# Patient Record
Sex: MALE | Race: WHITE | Employment: FULL TIME | URBAN - METROPOLITAN AREA
[De-identification: names, ages, dates, MRNs, and addresses within clinical notes are randomized per-mention and may not be internally consistent; named-entity substitution may affect disease eponyms.]

---

## 2021-12-27 ENCOUNTER — HOSPITAL ENCOUNTER (OUTPATIENT)
Age: 31
Setting detail: SPECIMEN
Discharge: HOME | End: 2021-12-27

## 2021-12-28 LAB
ABSOLUTE EOS #: 0.16 K/UL (ref 0–0.44)
ABSOLUTE IMMATURE GRANULOCYTE: 0.03 K/UL (ref 0–0.3)
ABSOLUTE LYMPH #: 1.72 K/UL (ref 1.1–3.7)
ABSOLUTE MONO #: 0.65 K/UL (ref 0.1–1.2)
ALBUMIN SERPL-MCNC: 3.9 G/DL (ref 3.5–5.2)
ALBUMIN/GLOBULIN RATIO: 1.5 (ref 1–2.5)
ALP BLD-CCNC: 64 U/L (ref 40–129)
ALT SERPL-CCNC: 20 U/L (ref 5–41)
ANION GAP SERPL CALCULATED.3IONS-SCNC: 15 MMOL/L (ref 9–17)
AST SERPL-CCNC: 23 U/L
BASOPHILS # BLD: 0 % (ref 0–2)
BASOPHILS ABSOLUTE: 0.03 K/UL (ref 0–0.2)
BILIRUB SERPL-MCNC: 0.19 MG/DL (ref 0.3–1.2)
BUN BLDV-MCNC: 17 MG/DL (ref 6–20)
BUN/CREAT BLD: ABNORMAL (ref 9–20)
CALCIUM SERPL-MCNC: 8.9 MG/DL (ref 8.6–10.4)
CHLORIDE BLD-SCNC: 103 MMOL/L (ref 98–107)
CHOLESTEROL/HDL RATIO: 3.3
CHOLESTEROL: 101 MG/DL
CO2: 20 MMOL/L (ref 20–31)
CREAT SERPL-MCNC: 1.07 MG/DL (ref 0.7–1.2)
DIFFERENTIAL TYPE: ABNORMAL
EOSINOPHILS RELATIVE PERCENT: 1 % (ref 1–4)
GFR AFRICAN AMERICAN: >60 ML/MIN
GFR NON-AFRICAN AMERICAN: >60 ML/MIN
GFR SERPL CREATININE-BSD FRML MDRD: ABNORMAL ML/MIN/{1.73_M2}
GFR SERPL CREATININE-BSD FRML MDRD: ABNORMAL ML/MIN/{1.73_M2}
GLUCOSE BLD-MCNC: 85 MG/DL (ref 70–99)
HCT VFR BLD CALC: 41.9 % (ref 40.7–50.3)
HDLC SERPL-MCNC: 31 MG/DL
HEMOGLOBIN: 13.7 G/DL (ref 13–17)
IMMATURE GRANULOCYTES: 0 %
LDL CHOLESTEROL: 57 MG/DL (ref 0–130)
LYMPHOCYTES # BLD: 15 % (ref 24–43)
MCH RBC QN AUTO: 30.7 PG (ref 25.2–33.5)
MCHC RBC AUTO-ENTMCNC: 32.7 G/DL (ref 28.4–34.8)
MCV RBC AUTO: 93.9 FL (ref 82.6–102.9)
MONOCYTES # BLD: 6 % (ref 3–12)
NRBC AUTOMATED: 0 PER 100 WBC
PDW BLD-RTO: 12.1 % (ref 11.8–14.4)
PLATELET # BLD: 398 K/UL (ref 138–453)
PLATELET ESTIMATE: ABNORMAL
PMV BLD AUTO: 9.6 FL (ref 8.1–13.5)
POTASSIUM SERPL-SCNC: 4.1 MMOL/L (ref 3.7–5.3)
RBC # BLD: 4.46 M/UL (ref 4.21–5.77)
RBC # BLD: ABNORMAL 10*6/UL
SEG NEUTROPHILS: 78 % (ref 36–65)
SEGMENTED NEUTROPHILS ABSOLUTE COUNT: 8.75 K/UL (ref 1.5–8.1)
SODIUM BLD-SCNC: 138 MMOL/L (ref 135–144)
TOTAL PROTEIN: 6.5 G/DL (ref 6.4–8.3)
TRIGL SERPL-MCNC: 64 MG/DL
TSH SERPL DL<=0.05 MIU/L-ACNC: 2.01 MIU/L (ref 0.3–5)
VLDLC SERPL CALC-MCNC: ABNORMAL MG/DL (ref 1–30)
WBC # BLD: 11.3 K/UL (ref 3.5–11.3)
WBC # BLD: ABNORMAL 10*3/UL

## 2022-06-17 ENCOUNTER — HOSPITAL ENCOUNTER (EMERGENCY)
Age: 32
Discharge: HOME | End: 2022-06-17
Payer: COMMERCIAL

## 2022-06-17 VITALS
HEART RATE: 84 BPM | RESPIRATION RATE: 16 BRPM | DIASTOLIC BLOOD PRESSURE: 73 MMHG | OXYGEN SATURATION: 99 % | SYSTOLIC BLOOD PRESSURE: 140 MMHG | WEIGHT: 205 LBS | TEMPERATURE: 99.5 F

## 2022-06-17 DIAGNOSIS — L03.011 PARONYCHIA OF RIGHT RING FINGER: Primary | ICD-10-CM

## 2022-06-17 PROCEDURE — 99213 OFFICE O/P EST LOW 20 MIN: CPT | Performed by: NURSE PRACTITIONER

## 2022-06-17 PROCEDURE — 99213 OFFICE O/P EST LOW 20 MIN: CPT

## 2022-06-17 RX ORDER — CLINDAMYCIN HYDROCHLORIDE 300 MG/1
300 CAPSULE ORAL 3 TIMES DAILY
Qty: 30 CAPSULE | Refills: 0 | Status: SHIPPED | OUTPATIENT
Start: 2022-06-17 | End: 2022-06-27

## 2022-06-17 ASSESSMENT — PAIN DESCRIPTION - DESCRIPTORS: DESCRIPTORS: ACHING;TENDER

## 2022-06-17 ASSESSMENT — ENCOUNTER SYMPTOMS
RHINORRHEA: 0
COLOR CHANGE: 1
ABDOMINAL PAIN: 0
NAUSEA: 0
EYE PAIN: 0
WHEEZING: 0
BACK PAIN: 0
EYE REDNESS: 0
EYE DISCHARGE: 0
VOMITING: 0
COUGH: 0
SHORTNESS OF BREATH: 0
CONSTIPATION: 0
DIARRHEA: 0
SORE THROAT: 0
ALLERGIC/IMMUNOLOGIC NEGATIVE: 1
TROUBLE SWALLOWING: 0

## 2022-06-17 ASSESSMENT — PAIN DESCRIPTION - ONSET: ONSET: PROGRESSIVE

## 2022-06-17 ASSESSMENT — PAIN DESCRIPTION - LOCATION: LOCATION: ARM

## 2022-06-17 ASSESSMENT — PAIN DESCRIPTION - ORIENTATION: ORIENTATION: RIGHT

## 2022-06-17 ASSESSMENT — PAIN - FUNCTIONAL ASSESSMENT
PAIN_FUNCTIONAL_ASSESSMENT: 0-10
PAIN_FUNCTIONAL_ASSESSMENT: ACTIVITIES ARE NOT PREVENTED

## 2022-06-17 ASSESSMENT — PAIN DESCRIPTION - FREQUENCY: FREQUENCY: CONTINUOUS

## 2022-06-17 ASSESSMENT — PAIN DESCRIPTION - PAIN TYPE: TYPE: ACUTE PAIN

## 2022-06-17 ASSESSMENT — PAIN SCALES - GENERAL: PAINLEVEL_OUTOF10: 6

## 2022-06-17 NOTE — ED PROVIDER NOTES
[facility]  Urgent Care Encounter      CHIEF COMPLAINT       Chief Complaint   Patient presents with    Paronychia     ring finger, right hand       Nurses Notes reviewed and I agree except as noted in the HPI. HISTORY OF PRESENT ILLNESS   Alem Linn is a 28 y.o. male who presents with 4 to 5 days of progressively worse infection in the right ring finger with axillary adenopathy and low-grade fever the last couple nights while he sleeping. 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 REVIEW OF SYSTEMS     Review of Systems   Constitutional: Positive for fever. Negative for activity change and fatigue. HENT: Negative for congestion, ear pain, rhinorrhea, sore throat and trouble swallowing. Eyes: Negative for pain, discharge and redness. Respiratory: Negative for cough, shortness of breath and wheezing. Cardiovascular: Negative. Gastrointestinal: Negative for abdominal pain, constipation, diarrhea, nausea and vomiting. Endocrine: Negative. Genitourinary: Negative for dysuria, frequency and urgency. Musculoskeletal: Negative for arthralgias, back pain and myalgias. Skin: Positive for color change. Negative for rash. Allergic/Immunologic: Negative. Neurological: Negative for dizziness, tremors, weakness and headaches. Hematological: Positive for adenopathy. Psychiatric/Behavioral: Negative for dysphoric mood and sleep disturbance. The patient is not nervous/anxious. PAST MEDICAL HISTORY   History reviewed. No pertinent past medical history. SURGICAL HISTORY     Patient  has a past surgical history that includes Finger surgery; Nasal sinus surgery; and Testicle surgery. CURRENT MEDICATIONS       Discharge Medication List as of 6/17/2022  6:40 PM          ALLERGIES     Patient is has No Known Allergies.     FAMILY HISTORY     Patient'sfamily history is not on file.    SOCIAL HISTORY     Patient  reports that he has been smoking cigarettes. He has been smoking about 1.50 packs per day. He has never used smokeless tobacco. He reports current alcohol use. He reports previous drug use. Drug: Cocaine. PHYSICAL EXAM     ED TRIAGE VITALS  BP: (!) 140/73, Temp: 99.5 °F (37.5 °C), Heart Rate: 84, Resp: 16, SpO2: 99 %  Physical Exam  Constitutional:       General: He is not in acute distress. Appearance: He is well-developed. He is not diaphoretic. HENT:      Right Ear: External ear normal.      Left Ear: External ear normal.      Nose: Nose normal.   Eyes:      General:         Right eye: No discharge. Left eye: No discharge. Conjunctiva/sclera: Conjunctivae normal.      Pupils: Pupils are equal, round, and reactive to light. Neck:      Vascular: No JVD. Cardiovascular:      Rate and Rhythm: Normal rate and regular rhythm. Pulmonary:      Effort: Pulmonary effort is normal. No respiratory distress. Chest:   Breasts:      Right: Axillary adenopathy present. Left: No axillary adenopathy. Musculoskeletal:         General: Swelling and tenderness present. No deformity. Normal range of motion. Cervical back: Normal range of motion. Lymphadenopathy:      Upper Body:      Right upper body: Axillary adenopathy present. Left upper body: No axillary adenopathy. Skin:     General: Skin is warm and dry. Capillary Refill: Capillary refill takes less than 2 seconds. Coloration: Skin is not pale. Findings: Erythema present. No rash. Neurological:      Mental Status: He is alert and oriented to person, place, and time. Coordination: Coordination normal.   Psychiatric:         Behavior: Behavior normal.         Thought Content: Thought content normal.         Judgment: Judgment normal.         DIAGNOSTIC RESULTS   Labs: No results found for this visit on 06/17/22.     IMAGING:    URGENT CARE COURSE:     Vitals: 06/17/22 1826   BP: (!) 140/73   Pulse: 84   Resp: 16   Temp: 99.5 °F (37.5 °C)   SpO2: 99%   Weight: 205 lb (93 kg)             Medications - No data to display  PROCEDURES:  None  FINAL IMPRESSION      1.  Paronychia of right ring finger        DISPOSITION/PLAN   DISPOSITION Decision To Discharge 06/17/2022 06:39:11 PM    PATIENT REFERRED TO:  [facility]  Joycelyn Eng [facility] [address] [place] [address]  [phone]    As needed    DISCHARGE MEDICATIONS:  Discharge Medication List as of 6/17/2022  6:40 PM      START taking these medications    Details   clindamycin (CLEOCIN) 300 MG capsule Take 1 capsule by mouth 3 times daily for 10 days, Disp-30 capsule, R-0Print           Discharge Medication List as of 6/17/2022  6:40 PM          Melissa Co, APRN - CNP           Melissa Co, APRN - CNP  06/17/22 9911

## 2022-06-17 NOTE — ED TRIAGE NOTES
Pt to room 2 with his girlfriend. He states he noticed a sore area on his right ring finger by the cuticle approx 4 days ago and it has progressively gotten worse. He states he is feeling pain in the back of his hand and that his right arm is sore and feels like there are swollen lymph nodes under his right arm pit now.

## 2022-12-14 ENCOUNTER — OFFICE VISIT (OUTPATIENT)
Dept: PSYCHIATRY | Age: 32
End: 2022-12-14

## 2022-12-14 DIAGNOSIS — F43.12 CHRONIC POST-TRAUMATIC STRESS DISORDER (PTSD): ICD-10-CM

## 2022-12-14 DIAGNOSIS — F41.1 GENERALIZED ANXIETY DISORDER: ICD-10-CM

## 2022-12-14 DIAGNOSIS — F31.81 BIPOLAR 2 DISORDER (HCC): Primary | ICD-10-CM

## 2022-12-14 RX ORDER — BUSPIRONE HYDROCHLORIDE 7.5 MG/1
7.5 TABLET ORAL 2 TIMES DAILY
Qty: 60 TABLET | Refills: 1 | Status: SHIPPED | OUTPATIENT
Start: 2022-12-14 | End: 2022-12-20 | Stop reason: DRUGHIGH

## 2022-12-14 RX ORDER — CYPROHEPTADINE HYDROCHLORIDE 4 MG/1
4-8 TABLET ORAL DAILY PRN
Qty: 60 TABLET | Refills: 0 | Status: SHIPPED | OUTPATIENT
Start: 2022-12-14

## 2022-12-14 RX ORDER — LAMOTRIGINE 25 MG/1
50 TABLET ORAL DAILY
Qty: 60 TABLET | Refills: 0 | Status: SHIPPED | OUTPATIENT
Start: 2022-12-14
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
## 2022-12-16 ENCOUNTER — TELEPHONE (OUTPATIENT)
Dept: PSYCHIATRY | Age: 32
End: 2022-12-16

## 2022-12-16 DIAGNOSIS — F31.81 BIPOLAR 2 DISORDER (HCC): Primary | ICD-10-CM

## 2022-12-16 DIAGNOSIS — F41.1 GENERALIZED ANXIETY DISORDER: ICD-10-CM

## 2022-12-16 DIAGNOSIS — F43.12 CHRONIC POST-TRAUMATIC STRESS DISORDER (PTSD): ICD-10-CM

## 2022-12-16 NOTE — TELEPHONE ENCOUNTER
Sophia Pearson called into the office stating that at his appt he was not aware of the dosage of medications he was currently on; so he is calling to report those:    Buspar 15mg/BID, Abilify 10mg/once daily and Prazosin 2mg/once nightly. Per chart; he was prescribed 7.5mg/BID of Buspar; he notes that he will utilize what he has at home to remain on the 15mg/BID dosing since he has been on that. Along with Lamictal 25-50mg/daily and Periactin 4mg-8mg/daily prn. Please advise.

## 2022-12-20 RX ORDER — BUSPIRONE HYDROCHLORIDE 15 MG/1
15 TABLET ORAL 2 TIMES DAILY
Qty: 60 TABLET | Refills: 1 | Status: SHIPPED | OUTPATIENT
Start: 2022-12-20

## 2022-12-20 RX ORDER — ARIPIPRAZOLE 10 MG/1
10 TABLET ORAL EVERY MORNING
Qty: 30 TABLET | Refills: 3 | Status: SHIPPED | OUTPATIENT
Start: 2022-12-20

## 2022-12-20 RX ORDER — PRAZOSIN HYDROCHLORIDE 2 MG/1
2 CAPSULE ORAL NIGHTLY
Qty: 30 CAPSULE | Refills: 3 | Status: SHIPPED | OUTPATIENT
Start: 2022-12-20

## 2023-01-05 ENCOUNTER — TELEPHONE (OUTPATIENT)
Dept: PSYCHIATRY | Age: 33
End: 2023-01-05

## 2023-01-05 NOTE — TELEPHONE ENCOUNTER
I cannot write a letter specifically regarding medical marijuana. However, I can write a letter simply stating the patient's diagnoses if he would like me to do that.

## 2023-01-05 NOTE — TELEPHONE ENCOUNTER
Patient called into the office stating he spoke with provider regarding his medical marijuana card. Reports provider mentioned writing a letter regarding medical diagnosis. He reports he had to file an appeal to use his medical card and the letter is needed. Patient states the letter can not be a recommendation letter for medical marijuana.

## 2023-01-11 ENCOUNTER — OFFICE VISIT (OUTPATIENT)
Dept: PSYCHIATRY | Age: 33
End: 2023-01-11
Payer: COMMERCIAL

## 2023-01-11 DIAGNOSIS — F31.81 BIPOLAR 2 DISORDER (HCC): Primary | ICD-10-CM

## 2023-01-11 DIAGNOSIS — F41.1 GENERALIZED ANXIETY DISORDER: ICD-10-CM

## 2023-01-11 DIAGNOSIS — F43.12 CHRONIC POST-TRAUMATIC STRESS DISORDER (PTSD): ICD-10-CM

## 2023-01-11 PROCEDURE — 4004F PT TOBACCO SCREEN RCVD TLK: CPT | Performed by: STUDENT IN AN ORGANIZED HEALTH CARE EDUCATION/TRAINING PROGRAM

## 2023-01-11 PROCEDURE — G8484 FLU IMMUNIZE NO ADMIN: HCPCS | Performed by: STUDENT IN AN ORGANIZED HEALTH CARE EDUCATION/TRAINING PROGRAM

## 2023-01-11 PROCEDURE — 99214 OFFICE O/P EST MOD 30 MIN: CPT | Performed by: STUDENT IN AN ORGANIZED HEALTH CARE EDUCATION/TRAINING PROGRAM

## 2023-01-11 PROCEDURE — G8428 CUR MEDS NOT DOCUMENT: HCPCS | Performed by: STUDENT IN AN ORGANIZED HEALTH CARE EDUCATION/TRAINING PROGRAM

## 2023-01-11 PROCEDURE — G8421 BMI NOT CALCULATED: HCPCS | Performed by: STUDENT IN AN ORGANIZED HEALTH CARE EDUCATION/TRAINING PROGRAM

## 2023-01-11 RX ORDER — BUSPIRONE HYDROCHLORIDE 15 MG/1
15 TABLET ORAL 3 TIMES DAILY
Qty: 90 TABLET | Refills: 0 | Status: SHIPPED | OUTPATIENT
Start: 2023-01-11

## 2023-01-11 RX ORDER — CYPROHEPTADINE HYDROCHLORIDE 4 MG/1
4-8 TABLET ORAL DAILY PRN
Qty: 60 TABLET | Refills: 0 | Status: SHIPPED | OUTPATIENT
Start: 2023-01-11

## 2023-01-11 RX ORDER — LAMOTRIGINE 200 MG/1
200 TABLET ORAL DAILY
Qty: 30 TABLET | Refills: 3 | Status: SHIPPED | OUTPATIENT
Start: 2023-01-11

## 2023-01-11 RX ORDER — PRAZOSIN HYDROCHLORIDE 2 MG/1
2 CAPSULE ORAL NIGHTLY
Qty: 30 CAPSULE | Refills: 3 | Status: SHIPPED | OUTPATIENT
Start: 2023-01-11

## 2023-01-11 RX ORDER — LAMOTRIGINE 100 MG/1
100 TABLET ORAL DAILY
Qty: 7 TABLET | Refills: 0 | Status: SHIPPED | OUTPATIENT
Start: 2023-01-11
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
## 2023-02-08 ENCOUNTER — TELEMEDICINE (OUTPATIENT)
Dept: PSYCHIATRY | Age: 33
End: 2023-02-08
Payer: COMMERCIAL

## 2023-02-08 DIAGNOSIS — F41.1 GENERALIZED ANXIETY DISORDER: ICD-10-CM

## 2023-02-08 DIAGNOSIS — F31.81 BIPOLAR 2 DISORDER (HCC): Primary | ICD-10-CM

## 2023-02-08 DIAGNOSIS — F43.12 CHRONIC POST-TRAUMATIC STRESS DISORDER (PTSD): ICD-10-CM

## 2023-02-08 PROCEDURE — G8428 CUR MEDS NOT DOCUMENT: HCPCS | Performed by: STUDENT IN AN ORGANIZED HEALTH CARE EDUCATION/TRAINING PROGRAM

## 2023-02-08 PROCEDURE — G8484 FLU IMMUNIZE NO ADMIN: HCPCS | Performed by: STUDENT IN AN ORGANIZED HEALTH CARE EDUCATION/TRAINING PROGRAM

## 2023-02-08 PROCEDURE — 99214 OFFICE O/P EST MOD 30 MIN: CPT | Performed by: STUDENT IN AN ORGANIZED HEALTH CARE EDUCATION/TRAINING PROGRAM

## 2023-02-08 PROCEDURE — G8421 BMI NOT CALCULATED: HCPCS | Performed by: STUDENT IN AN ORGANIZED HEALTH CARE EDUCATION/TRAINING PROGRAM

## 2023-02-08 PROCEDURE — 4004F PT TOBACCO SCREEN RCVD TLK: CPT | Performed by: STUDENT IN AN ORGANIZED HEALTH CARE EDUCATION/TRAINING PROGRAM

## 2023-02-08 RX ORDER — LAMOTRIGINE 150 MG/1
150 TABLET ORAL 2 TIMES DAILY
Qty: 30 TABLET | Refills: 3 | Status: SHIPPED | OUTPATIENT
Start: 2023-02-08

## 2023-02-08 RX ORDER — BUSPIRONE HYDROCHLORIDE 30 MG/1
30 TABLET ORAL 2 TIMES DAILY
Qty: 60 TABLET | Refills: 2 | Status: SHIPPED | OUTPATIENT
Start: 2023-02-08

## 2023-02-08 RX ORDER — PRAZOSIN HYDROCHLORIDE 2 MG/1
4 CAPSULE ORAL NIGHTLY
Qty: 60 CAPSULE | Refills: 2 | Status: SHIPPED | OUTPATIENT
Start: 2023-02-08

## 2023-02-08 NOTE — PROGRESS NOTES
[address] [address]  Dept: [phone]  Loc: [facility]  1990 2/8/2023     F/U appt    TELEHEALTH EVALUATION -- Audio/Visual (During [id number] public health emergency)    Patient location: home  Physician location: home, [facility]  This virtual visit was conducted via interactive, real-time video. DSM:  Dx:    ICD-10-CM    1. Bipolar 2 disorder (HCC)  F31.81 lamoTRIgine (LAMICTAL) 150 MG tablet      2. Generalized anxiety disorder  F41.1 busPIRone (BUSPAR) 30 MG tablet      3. Chronic post-traumatic stress disorder (PTSD)  F43.12 prazosin (MINIPRESS) 2 MG capsule           Interim Hx:  Chief Complaint:   Chief Complaint   Patient presents with    Anxiety    Depression    Follow-up        HPI: Patient is a 33year old male who presents for follow up. Anxiety remains elevated due to work and conflict with his ex-girlfriend, but he reports some improvement in terms of anxiety. Depression remains elevated and is \"no different\". He reports he is considering stopping his medications and it was recommended that he continue his medications to avoid further decompensation. He is starting psychotherapy in March. Denies current active and passive suicidal and homicidal ideation. Denies auditory or visual hallucinations. Denies SE to current medications. Does not report issues with appetite. Patient continues to endorse night terrors. ROS:   Gen: Denies F/C/N/V  HEENT: Denies Vision/hearing changes/sore throat  CV: Denies CP/Palp  Pulm: Denies SOB/Cough/Wheeze  GI: Denies Abd pain  Skin: Denies Rashes/Lumps/Bruises  Neurologic: Denies changes in memory/speech/mental status.  Denies h/o seizures/DTs   Psychiatric: +depression +anxiety     MSE:  Cognition - Alert and Oriented x 4  Appearance - dressed casually and appropriately for the weather  Behavior - pleasant and cooperative  Motor - no tardive dyskinesia or other EPS observed; no psychomotor agitation/retardation observed  Gait and Station - unable to assess via tele-psychiatry  Speech - normal rate, rhythm, and volume  Eye Contact - maintains  Mood - \"no different\"  Affect - full range  Thought Content/Perceptions:   Psychosis - denies AH/VH/delusions   SI/HI -  denies  Thought Process/Associations -  logical and linear  Memory - grossly intact  Insight - good  Judgment - good    Assessment:   Diagnosis Orders   1. Bipolar 2 disorder (HCC)  lamoTRIgine (LAMICTAL) 150 MG tablet      2. Generalized anxiety disorder  busPIRone (BUSPAR) 30 MG tablet      3. Chronic post-traumatic stress disorder (PTSD)  prazosin (MINIPRESS) 2 MG capsule           Plan:  -Continue cyproheptadine 4-8 daily prn for panic attacks. Risk of sedation was discussed.  -Increase lamotrigine 200 mg daily to 150 mg bid for mood. Risk of [place] Syndrome was discussed.  -Increase buspirone 15 mg tid to 30 mg bid for anxiety.  -Continue abilify 10 mg qam. Risks including EPS, tardive dyskinesia, and weight gain were discussed.  -Increase prazosin 2 mg qhs to 4 mg qhs for nightmares. Instructed to discontinue if he feels lightheaded or dizzy.  -Pt reports being adherent to medications and encouraged to continue  -Continue all current psychiatric medications as prescribed  -Discussed medications with the patient and changes are as noted above with patient agreement.  -Discussed diagnosis and treatment plan with the patient  -Provided brief supportive therapy through active listening     -Patient Education: Discussed medications including indications, potential side effects, contraindications, and risks/benefits; also discussed alternative medications/treatments. Discussed the potential need for follow up laboratory studies related to medications and patient demonstrated understanding and compliance.  Patient participated in medications decision making was given the opportunity to ask questions/express preferences and concerns. Patient demonstrates good understanding of medications and is compliant/in agreement with current plan. -RTC in (4-6) weeks, sooner if needed  -Labs: n/a  -Discussed abstaining from illicit drugs/alcohol  -Continue to work with /therapist  -Patient informed to call crisis line/911 or go to ER if experiencing crisis, SI, HI, or psychosis. Vijay Farias, was evaluated through a synchronous (real-time) audio-video encounter. The patient (or guardian if applicable) is aware that this is a billable service, which includes applicable copays. This virtual visit was conducted with patient's (and/or legal guardian's) consent. The visit was conducted pursuant to the emergency declaration under the [address] [place] [address], [address] [place] [address] [facility] authority and the [employer] Act. Patient identification was verified, and a caregiver was present when appropriate. The patient was located in a state where the provider was licensed to provide care.       Total time spent for this encounter: 30 minutes    Electronically signed by Yamila Wasserman DO on 2/8/2023 at 3:02 PM     Yamila Wasserman DO  2/8/2023

## 2023-03-13 ENCOUNTER — TELEMEDICINE (OUTPATIENT)
Dept: PSYCHIATRY | Age: 33
End: 2023-03-13
Payer: COMMERCIAL

## 2023-03-13 DIAGNOSIS — F31.81 BIPOLAR 2 DISORDER (HCC): Primary | ICD-10-CM

## 2023-03-13 DIAGNOSIS — F43.12 CHRONIC POST-TRAUMATIC STRESS DISORDER (PTSD): ICD-10-CM

## 2023-03-13 DIAGNOSIS — F41.1 GENERALIZED ANXIETY DISORDER: ICD-10-CM

## 2023-03-13 PROCEDURE — G8428 CUR MEDS NOT DOCUMENT: HCPCS | Performed by: STUDENT IN AN ORGANIZED HEALTH CARE EDUCATION/TRAINING PROGRAM

## 2023-03-13 PROCEDURE — G8484 FLU IMMUNIZE NO ADMIN: HCPCS | Performed by: STUDENT IN AN ORGANIZED HEALTH CARE EDUCATION/TRAINING PROGRAM

## 2023-03-13 PROCEDURE — 99214 OFFICE O/P EST MOD 30 MIN: CPT | Performed by: STUDENT IN AN ORGANIZED HEALTH CARE EDUCATION/TRAINING PROGRAM

## 2023-03-13 PROCEDURE — G8421 BMI NOT CALCULATED: HCPCS | Performed by: STUDENT IN AN ORGANIZED HEALTH CARE EDUCATION/TRAINING PROGRAM

## 2023-03-13 PROCEDURE — 4004F PT TOBACCO SCREEN RCVD TLK: CPT | Performed by: STUDENT IN AN ORGANIZED HEALTH CARE EDUCATION/TRAINING PROGRAM

## 2023-03-13 NOTE — PROGRESS NOTES
[address]  [facility] [place] [address]  Dept: [phone]  Loc: Teresa Peterson  1990  3/13/2023     F/U appt    TELEHEALTH EVALUATION -- Audio/Visual (During UVIJX-21 public health emergency)    Patient location: home  Physician location: home, [facility]  This virtual visit was conducted via interactive, real-time video. DSM:  Dx:    ICD-10-CM    1. Bipolar 2 disorder (HCC)  F31.81       2. Generalized anxiety disorder  F41.1       3. Chronic post-traumatic stress disorder (PTSD)  F43.12            Interim Hx:  Chief Complaint:   Chief Complaint   Patient presents with    Follow-up    Medication Check      HPI: Patient is a 33year old male who presents for follow up. Patient discontinued all of his psychotropic medications against my professional recommendation. He is currently not taking any psychotropic medications. Risk of decompensation was discussed. Patient started psychotherapy and wants to focus moreso on therapy for treatment as opposed to medications. He reports depression and anxiety are currently stable and reports mood as \"good\". Denies current active and passive suicidal and homicidal ideation. Denies auditory or visual hallucinations. Does not report issues with appetite. Patient reports difficulty sleeping which he attributes to recently starting to work nights. ROS:   Gen: Denies F/C/N/V  HEENT: Denies Vision/hearing changes/sore throat  CV: Denies CP/Palp  Pulm: Denies SOB/Cough/Wheeze  GI: Denies Abd pain  Skin: Denies Rashes/Lumps/Bruises  Neurologic: Denies changes in memory/speech/mental status.  Denies h/o seizures/DTs   Psychiatric: anxiety and depression are stable     MSE:  Cognition - Alert and Oriented x 4  Appearance - dressed casually and appropriately for the weather  Behavior - pleasant and cooperative  Motor - no tardive dyskinesia or other EPS observed; no psychomotor agitation/retardation observed  Gait and Station - unable to assess via tele-psychiatry  Speech - normal rate, rhythm, and volume  Eye Contact - maintains  Mood - \"good\"  Affect - full range  Thought Content/Perceptions:   Psychosis - denies AH/VH/delusions   SI/HI -  denies  Thought Process/Associations -  logical and linear  Memory - grossly intact  Insight - fair  Judgment - poor    Assessment:   Diagnosis Orders   1. Bipolar 2 disorder ([facility] [address]        2. Generalized anxiety disorder        3. Chronic post-traumatic stress disorder (PTSD)             Plan: (patient discontinued all medications against my advice; risk of decompensation was discussed)  -Discontinue cyproheptadine 4-8 daily prn for panic attacks. Risk of sedation was discussed.  -Discontinue lamotrigine 150 mg bid for mood. Risk of Exie Code Syndrome was discussed.  -Discontinue buspirone 30 mg bid for anxiety.  -Discontinue abilify 10 mg qam. Risks including EPS, tardive dyskinesia, and weight gain were discussed.  -Discontinue prazosin 4 mg qhs for nightmares. Instructed to discontinue if he feels lightheaded or dizzy.    -Discussed medications with the patient and changes are as noted above with patient agreement.  -Discussed diagnosis and treatment plan with the patient  -Provided brief supportive therapy through active listening     -Patient Education: Discussed medications including indications, potential side effects, contraindications, and risks/benefits; also discussed alternative medications/treatments. Discussed the potential need for follow up laboratory studies related to medications and patient demonstrated understanding and compliance. Patient participated in medications decision making was given the opportunity to ask questions/express preferences and concerns. Patient demonstrates good understanding of medications and is compliant/in agreement with current plan.     -RTC in (4-6) weeks, sooner if needed  -Labs: n/a  -Discussed abstaining from illicit drugs/alcohol  -Continue to work with /therapist  -Patient informed to call crisis line/911 or go to ER if experiencing crisis, SI, HI, or psychosis. Ketan Clements, was evaluated through a synchronous (real-time) audio-video encounter. The patient (or guardian if applicable) is aware that this is a billable service, which includes applicable copays. This virtual visit was conducted with patient's (and/or legal guardian's) consent. The visit was conducted pursuant to the emergency declaration under the [address] [place] [address] authority and the [employer] Act. Patient identification was verified, and a caregiver was present when appropriate. The patient was located in a state where the provider was licensed to provide care.       Total time spent for this encounter: 30 minutes    Electronically signed by Richi Lane DO on 3/13/2023 at 2:23 PM      Richi Lane DO  3/13/2023

## 2023-04-04 ENCOUNTER — HOSPITAL ENCOUNTER (OUTPATIENT)
Age: 33
Setting detail: SPECIMEN
Discharge: HOME | End: 2023-04-04

## 2023-04-04 LAB
ABSOLUTE EOS #: 0.1 K/UL (ref 0–0.44)
ABSOLUTE IMMATURE GRANULOCYTE: <0.03 K/UL (ref 0–0.3)
ABSOLUTE LYMPH #: 2.38 K/UL (ref 1.1–3.7)
ABSOLUTE MONO #: 0.48 K/UL (ref 0.1–1.2)
ALBUMIN SERPL-MCNC: 4.3 G/DL (ref 3.5–5.2)
ALBUMIN/GLOBULIN RATIO: 1.9 (ref 1–2.5)
ALP SERPL-CCNC: 81 U/L (ref 40–129)
ALT SERPL-CCNC: 15 U/L (ref 5–41)
ANION GAP SERPL CALCULATED.3IONS-SCNC: 14 MMOL/L (ref 9–17)
AST SERPL-CCNC: 14 U/L
BASOPHILS # BLD: 0 % (ref 0–2)
BASOPHILS ABSOLUTE: <0.03 K/UL (ref 0–0.2)
BILIRUB SERPL-MCNC: 0.4 MG/DL (ref 0.3–1.2)
BUN SERPL-MCNC: 14 MG/DL (ref 6–20)
CALCIUM SERPL-MCNC: 8.8 MG/DL (ref 8.6–10.4)
CHLORIDE SERPL-SCNC: 104 MMOL/L (ref 98–107)
CHOLEST SERPL-MCNC: 130 MG/DL
CHOLESTEROL/HDL RATIO: 2.8
CO2 SERPL-SCNC: 22 MMOL/L (ref 20–31)
CREAT SERPL-MCNC: 1.06 MG/DL (ref 0.7–1.2)
EOSINOPHILS RELATIVE PERCENT: 2 % (ref 1–4)
GFR SERPL CREATININE-BSD FRML MDRD: >60 ML/MIN/1.73M2
GLUCOSE SERPL-MCNC: 112 MG/DL (ref 70–99)
HAV IGM SER QL: NONREACTIVE
HBV CORE IGM SER QL: NONREACTIVE
HBV SURFACE AG SER QL: NONREACTIVE
HCT VFR BLD AUTO: 43.4 % (ref 40.7–50.3)
HCV AB SER QL: NONREACTIVE
HDLC SERPL-MCNC: 46 MG/DL
HGB BLD-MCNC: 14.9 G/DL (ref 13–17)
IMMATURE GRANULOCYTES: 0 %
LDLC SERPL CALC-MCNC: 68 MG/DL (ref 0–130)
LYMPHOCYTES # BLD: 38 % (ref 24–43)
MCH RBC QN AUTO: 31.2 PG (ref 25.2–33.5)
MCHC RBC AUTO-ENTMCNC: 34.3 G/DL (ref 28.4–34.8)
MCV RBC AUTO: 91 FL (ref 82.6–102.9)
MONOCYTES # BLD: 8 % (ref 3–12)
NRBC AUTOMATED: 0 PER 100 WBC
PDW BLD-RTO: 12.6 % (ref 11.8–14.4)
PLATELET # BLD AUTO: 293 K/UL (ref 138–453)
PMV BLD AUTO: 9.8 FL (ref 8.1–13.5)
POTASSIUM SERPL-SCNC: 3.3 MMOL/L (ref 3.7–5.3)
PROT SERPL-MCNC: 6.6 G/DL (ref 6.4–8.3)
RBC # BLD: 4.77 M/UL (ref 4.21–5.77)
SEG NEUTROPHILS: 52 % (ref 36–65)
SEGMENTED NEUTROPHILS ABSOLUTE COUNT: 3.35 K/UL (ref 1.5–8.1)
SODIUM SERPL-SCNC: 140 MMOL/L (ref 135–144)
TESTOST SERPL-MCNC: 448 NG/DL (ref 220–1000)
TRIGL SERPL-MCNC: 82 MG/DL
TSH SERPL-ACNC: 3.39 UIU/ML (ref 0.3–5)
WBC # BLD AUTO: 6.3 K/UL (ref 3.5–11.3)

## 2023-05-10 ENCOUNTER — HOSPITAL ENCOUNTER (OUTPATIENT)
Age: 33
Setting detail: SPECIMEN
Discharge: HOME | End: 2023-05-10

## 2023-05-11 LAB
CHLAMYDIA DNA UR QL NAA+PROBE: NEGATIVE
HIV 1+2 AB+HIV1 P24 AG SERPL QL IA: NONREACTIVE
N GONORRHOEA DNA UR QL NAA+PROBE: NEGATIVE
POTASSIUM SERPL-SCNC: 3.6 MMOL/L (ref 3.7–5.3)
SOURCE: NORMAL
SPECIMEN DESCRIPTION: NORMAL
T PALLIDUM AB SER QL IA: NONREACTIVE
TRICHOMONAS VAGINALI, MOLECULAR: NEGATIVE